# Patient Record
Sex: MALE | Race: WHITE | URBAN - METROPOLITAN AREA
[De-identification: names, ages, dates, MRNs, and addresses within clinical notes are randomized per-mention and may not be internally consistent; named-entity substitution may affect disease eponyms.]

---

## 2017-01-31 ENCOUNTER — TELEPHONE (OUTPATIENT)
Dept: CARDIOLOGY | Facility: CLINIC | Age: 47
End: 2017-01-31

## 2017-01-31 NOTE — TELEPHONE ENCOUNTER
Patient's mother called requesting a return to for DOT letter from Dr. Zamudio. Patient is an over the [place] DOT  who was seen here 10/16 for MI/Stent.  Per Dr. Zamudio's note - I've explained to him that have to work with the DOT as to what further testing they will want to reinstate his  license.     Recommendations were - Follow up with primary care provider and establish care with a cardiologist in [place]  Return to work. Per DOT regulations.  Advised patient to contact [place] [employer] or cardiologist for assistance.

## 2017-02-17 ENCOUNTER — TELEPHONE (OUTPATIENT)
Dept: CARDIOLOGY | Facility: CLINIC | Age: 47
End: 2017-02-17

## 2017-02-17 NOTE — TELEPHONE ENCOUNTER
Call from patient's mother asking Dr. Zamudio to OK patient to go to work. Reviewed with mother that patient needs to contact the [facility] and see what steps are needed through their protocol for a license. Reviewed with mother that patient needs a local cardiologist to manage his medications and further testing as needed. Mother verbalized understanding and agreed with the plan.

## 2017-02-21 ENCOUNTER — TELEPHONE (OUTPATIENT)
Dept: CARDIOLOGY | Facility: CLINIC | Age: 47
End: 2017-02-21

## 2017-02-21 NOTE — TELEPHONE ENCOUNTER
"Patient called, insisting that Dr. Zamudio write a DOT letter for patient to return to work. Per discharge note patient was to follow up with cardiologist in [facility] for further care.   10-26-16 Note   Coronary artery disease involving native coronary artery of native heart with unstable angina pectoris (H)    S/p drug eluting stent to LAD    Remain on dual antiplatelet therapy for 1 year uninterrupted with Plavix and aspirin    Continue Metoprolol and Lisinopril    Follow up with primary care provider and establish care with a cardiologist in [place]    Return to work. Per DOT regulations.  patient stated \"it is my choice to follow up with a  And I chose not to\".   Patient abruptly  hung up.   "